# Patient Record
Sex: FEMALE | Race: WHITE | NOT HISPANIC OR LATINO | Employment: OTHER | URBAN - METROPOLITAN AREA
[De-identification: names, ages, dates, MRNs, and addresses within clinical notes are randomized per-mention and may not be internally consistent; named-entity substitution may affect disease eponyms.]

---

## 2017-09-16 ENCOUNTER — HOSPITAL ENCOUNTER (EMERGENCY)
Facility: MEDICAL CENTER | Age: 32
End: 2017-09-16
Attending: EMERGENCY MEDICINE
Payer: COMMERCIAL

## 2017-09-16 ENCOUNTER — HOSPITAL ENCOUNTER (OUTPATIENT)
Dept: RADIOLOGY | Facility: MEDICAL CENTER | Age: 32
End: 2017-09-16

## 2017-09-16 ENCOUNTER — APPOINTMENT (OUTPATIENT)
Dept: RADIOLOGY | Facility: MEDICAL CENTER | Age: 32
End: 2017-09-16
Attending: EMERGENCY MEDICINE
Payer: COMMERCIAL

## 2017-09-16 VITALS
TEMPERATURE: 98.6 F | HEIGHT: 69 IN | SYSTOLIC BLOOD PRESSURE: 143 MMHG | HEART RATE: 69 BPM | WEIGHT: 251.32 LBS | DIASTOLIC BLOOD PRESSURE: 74 MMHG | BODY MASS INDEX: 37.22 KG/M2 | RESPIRATION RATE: 16 BRPM | OXYGEN SATURATION: 97 %

## 2017-09-16 DIAGNOSIS — O20.0 ABORTION, THREATENED: ICD-10-CM

## 2017-09-16 PROCEDURE — 700102 HCHG RX REV CODE 250 W/ 637 OVERRIDE(OP): Performed by: EMERGENCY MEDICINE

## 2017-09-16 PROCEDURE — A9270 NON-COVERED ITEM OR SERVICE: HCPCS | Performed by: EMERGENCY MEDICINE

## 2017-09-16 PROCEDURE — 76817 TRANSVAGINAL US OBSTETRIC: CPT

## 2017-09-16 PROCEDURE — 99284 EMERGENCY DEPT VISIT MOD MDM: CPT

## 2017-09-16 RX ORDER — ACETAMINOPHEN 325 MG/1
650 TABLET ORAL ONCE
Status: COMPLETED | OUTPATIENT
Start: 2017-09-16 | End: 2017-09-16

## 2017-09-16 RX ORDER — FOLIC ACID 1 MG/1
1 TABLET ORAL DAILY
COMMUNITY

## 2017-09-16 RX ADMIN — ACETAMINOPHEN 650 MG: 325 TABLET, FILM COATED ORAL at 21:21

## 2017-09-17 NOTE — ED PROVIDER NOTES
ED Provider Note    Scribed for Paulie Akins M.D. by Kristopher Palomo. 2017  6:29 PM    Means of arrival: Walk-in  History obtained from: Patient  History limited by: None    CHIEF COMPLAINT  Chief Complaint   Patient presents with   • Vaginal Bleeding     approx 8 weeks pregnant   • Sent by MD     rule out ectopic pregnancy       HPI  Hillary Márquez is a 31 y.o. female who presents complaining of vaginal bleeding onset 8:00 PM yesterday evening. Her bleeding onset as minimal brown spotting then increased in severity bright red clots in the middle of the night. She has only used four pads since onset of her vaginal bleeding. Patient has not yet entirely soaked a pad with blood. The patient reports associated pelvic cramping located in the primarily on the lower back, loss of appetite, mild abdominal pain, headache, nausea, and urinary frequency. She had three episodes of diarrhea yesterday after eating lunch, but no diarrhea since. Patient only at toast at breakfast today and has had no fluids. She denies any dysuria, vomiting, or fever. Patient received an abdominal and transvaginal ultrasound as well as blood work at [facility] prompting the physician there to refer here for ectopic pregnancy rule out. Her blood work indicated that her CMP was normal, her potassium was low, and her CBC was normal. Her HCG quantitative was 2200 on  and 6300 today. Her blood typing taken at [facility] is Rh positive. She has no history of diabetes mellitus. She has had two shoulder surgeries, one foot surgery, and cholecystectomy in the past. She denies a past apppendectomy. Six years ago the patient did not have a period for several months secondary to a gallbladder condiiton and rapid weight loss. Patient is status  at approximately eight weeks and her last menstrual period was on .      REVIEW OF SYSTEMS  Pertinent positives include: vaginal bleeding, pelvic cramping located in the primarily on the lower  "back, diarrhea, loss of appetite, mild abdominal pain, headache, nausea, and urinary frequency.  Pertinent negatives include: dysuria, vomiting, or fever.  10+ systems reviewed and negative.    C    PAST MEDICAL HISTORY      SOCIAL HISTORY  Social History   Substance Use Topics   • Smoking status: Never Smoker   • Smokeless tobacco: Never Used   • Alcohol use No     History   Drug Use No       SURGICAL HISTORY  Past Surgical History:   Procedure Laterality Date   • CHOLECYSTECTOMY     • DENTAL EXTRACTION(S)     • FOOT ORIF Right    • SHOULDER ARTHROSCOPY      x2       CURRENT MEDICATIONS  No current facility-administered medications for this encounter.      Current Outpatient Prescriptions   Medication Sig Dispense Refill   • Prenatal Multivit-Min-Fe-FA (PRE- PO) Take  by mouth.     • folic acid (FOLVITE) 1 MG Tab Take 1 mg by mouth every day.         ALLERGIES  Allergies   Allergen Reactions   • Pcn [Penicillins]        PHYSICAL EXAM  VITAL SIGNS: /74   Pulse 89   Temp 36.9 °C (98.5 °F) (Temporal)   Resp 16   Ht 1.753 m (5' 9\")   Wt 114 kg (251 lb 5.2 oz)   LMP 2017   SpO2 98%   BMI 37.11 kg/m²   Reviewed andHypertensive.  Constitutional: Well developed, Well nourished.  HENT: Normocephalic, atraumatic, bilateral external ears normal, oropharynx moist, No exudates or erythema.   Eyes: PERRLA, conjunctiva pink, no scleral icterus.   Cardiovascular: Regular rate and rhythm. No murmurs, rubs or gallops.   Respiratory: Lungs clear to auscultation bilaterally. No wheezes, rales, or rhonchi.   Gastrointestinal:  Abdomen soft, non-tender, non distended.   Skin: No erythema, no rash.   Genitourinary: No costovertebral angle tenderness.   Neurologic: Alert & oriented x 3, cranial nerves 2-12 intact by passive exam.  No focal deficit noted.  Psychiatric: Affect normal, Judgment normal, Mood normal.     DIFFERENTIAL DIAGNOSIS:  spontaneous  vs ectopic vs viable " pregnancy.    RADIOLOGY/PROCEDURES  US-OB PELVIS TRANSVAGINAL   Final Result      1.  No intrauterine pregnancy demonstrated.   2.  Heterogeneous thickening of the endometrium with complex fluid in the cervical canal, suggesting clot or possibly retained products of conception.   3.  Ectopic pregnancy is not excluded. Recommend followup ultrasound and serial beta-hCG levels.   4.  Complex fluid collection or cystic structure in the cul-de-sac measuring 2.5 cm, possibly hemorrhagic.   5.  Probable LEFT ovary corpus luteum and adjacent follicle.   6.  No evidence for ovarian torsion.      US-FOREIGN FILM ULTRASOUND   Final Result        INTERVENTIONS:  Medications   acetaminophen (TYLENOL) tablet 650 mg (650 mg Oral Given 9/16/17 2121)       COURSE & MEDICAL DECISION MAKING    6:29 PM - Patient seen and examined at bedside. She will received PO fluids to treat for dehydration.    6:42 PM I requested the patient's imaging from [facility].    6:45 PM Review of imaging results from [facility] reveal no adnexal masses or fluid inclusions.    6:50 PM I discussed the patient's case and the above findings with Dr. Clement (Gynecology) who agrees to evaluate the patient.    6:55 PM Recheck: Patient re-evaluated at beside. Patient reports feeling the same. Discussed patient's condition and treatment plan. I informed her that she would be seen by Dr. Clement, Gynecology. The patient understood and is in agreement.    8:55 PM Recheck: Patient re-evaluated at beside. Patient reports a headache, but tolerated her PO fluids. I will order Tylenol tablet 650 mg to treat. Discussed patient's condition and treatment plan. The patient understood and is in agreement.    9:49 PM Paged Gynecology.    9:50 PM Thickening of the lining of the uterus is present, but no uterine pregnancy is present. Cannot rule out ectopic pregnancy. Radiology recommends follow up ultrasound.    9:58 PM I discussed the patient's case and the above  "findings with Dr. Clement (Gynecology) and updated her colleague on her ED test results.    10:02 PM Recheck: Patient re-evaluated at beside. Discussed patient's condition and treatment plan including waiting for a D&E as an outpatient. Patient's radiology results discussed. The patient is resting in bed comfortably. I discussed plans for discharge with a referral to Dr. Clement, GYN, and instructed to return to the ED if her symptoms worsen. Patient understands and agrees. her vitals prior to discharge are:  /74   Pulse 69   Temp 36.9 °C (98.5 °F) (Temporal)   Resp 16   Ht 1.753 m (5' 9\")   Wt 114 kg (251 lb 5.2 oz)   LMP 2017   SpO2 97%   BMI 37.11 kg/m²     Case discussed again with Dr. Clement at discharge. The patient declined dilation and evacuation.    This patient presents with vaginal bleeding and abdominal pain 8 weeks into her 1st pregnancy and likely has a spontaneous . Ectopic pregnancy is still possible. There is no evidence of viable pregnancy. There is no significant anemia..    PLAN:  Repeat hCG 2 days  Follow-up with primary physician in the [place] and with Dr. Posey  Repeat ultrasound  Return to ER for heavy bleeding, severe pain, dizziness, pallor, ill appearance  Threatened  handout given    Camila Posey M.D.  [address] [facility] [place] [address]  [phone]    Schedule an appointment as soon as possible for a visit in 3 days  As needed      CONDITION: Patient will be discharged home in stable condition.    FINAL IMPRESSION  1. , threatened          Electronically signed by: Kristopher Palomo, 2017 6:29 PM    The note accurately reflects work and decisions made by me.  Paulie Akins  2017  10:44 PM      "

## 2017-09-17 NOTE — ED NOTES
Pt ambulates to triage with .  Pt POV transfer from [facility] for possible ectopic pregnancy.  Pt approx 8 weeks pregnant.  Reports vaginal bleeding that started last night.  A&ox4.  Tearful.  Pt to lobby & advised to inform RN of any changes. Charge Rn aware of pt.

## 2017-09-17 NOTE — ED NOTES
DC instructions discussed with patient and family for miscarrage. She will return to ED with heavy bleeding, severe pain, fever chills fainting or other concerning symptoms.  Pt and family verbalized understanding of all DC instructions, follow up and prescriptions discussed.   Pt ambulated to lobby with upright and steady gait. All DC paperwork with patient at time of DC.

## 2017-09-17 NOTE — DISCHARGE INSTRUCTIONS
Threatened Miscarriage    Rest (no vigorous exertion or intercourse) until pain and/or bleeding stop.  Drink plenty of fluids.  Followup with your doctor and Dr. Posey as needed. Obtain repeat blood test in 2 days.  Have your doctor order a followup ultrasound when bleeding resolves or if your conditions worsens Take tylenol for pain.  Return to [facility] for dizziness, fainting, pallor, worsening pain, fever or heavy vaginal bleeding (soaking 4 pads in 4 hours).    Bleeding in pregnancy is common. This is sometimes called a threatened miscarriage. You have been diagnosed as having a threatened miscarriage. This is a pregnancy that is threatening to end before the twentieth week of pregnancy. Often this bleeding stops with bed rest or decreased activities as suggested by your caregiver. You may be asked to not have sexual intercourse until further notice. Sometimes this can progress to complete or incomplete miscarriage. This may or may not require further treatment.    When a miscarriage becomes complete and all products of conception have been passed, often no treatment is needed. If the miscarriage is incomplete (parts of the fetus or placenta remain behind), further treatment may be needed. The most common reason for further treatment is continued bleeding (hemorrhage). This often occurs if a miscarriage is incomplete. Tissue left behind may also become infected. Treatment usually is curettage. This is when the remaining products of pregnancy are removed. This can be done by a simple sucking procedure (suction curettage) or a simple scraping of the inside of the uterus. This may be done in the hospital or in the caregiver's office. This is only done when your caregiver knows that there is no chance for the pregnancy to proceed to term. This is determined by physical examination, negative pregnancy test, and an ultrasound revealing a dead fetus.    Miscarriages are often a very emotional time for  prospective mothers. This is not you or your partners fault. It did not occur because of an inadequacy in you or your partner. Nearly all miscarriages occur because the pregnancy has started off wrongly. At least half of these pregnancies have a chromosomal abnormality. It is almost always not inherited. Others may have developmental problems with the fetus or placenta. This does not always show up even when the products miscarried are studied under the microscope. The miscarriage is nearly always not your fault. You can begin trying for another pregnancy as soon as your caregiver says it is OK.    HOME CARE INSTRUCTIONS  Ø Your caregiver may order bed rest. You may only get up to the bathroom. You may be allowed to to continue light activity. You may need to make arrangements for the care of children and for any other responsibilities.  Ø Keep track of the number of pads you use each day and how saturated (soaked) they are. Record this information.  Ø DO NOT USE TAMPONS. Do not douche or have sexual intercourse until approved by your caregiver.    seek immediate medical attention if:  Ø You have severe cramps in your stomach, back, or abdomen.  Ø You run an unexplained temperature.  Ø You pass large clots or tissue. Save any tissue for your caregiver to inspect.  Ø Your bleeding increases or you become light-headed, weak, or have fainting episodes.    [employer]® Patient Information ©2007 [employer].

## 2019-08-08 ENCOUNTER — HOSPITAL ENCOUNTER (INPATIENT)
Dept: HOSPITAL 8 - LDIP | Age: 34
LOS: 3 days | Discharge: HOME | End: 2019-08-11
Attending: OBSTETRICS & GYNECOLOGY | Admitting: OBSTETRICS & GYNECOLOGY
Payer: COMMERCIAL

## 2019-08-08 VITALS — HEIGHT: 69 IN | WEIGHT: 293 LBS | BODY MASS INDEX: 43.4 KG/M2

## 2019-08-08 DIAGNOSIS — Z83.3: ICD-10-CM

## 2019-08-08 DIAGNOSIS — Z90.49: ICD-10-CM

## 2019-08-08 DIAGNOSIS — Z82.49: ICD-10-CM

## 2019-08-08 DIAGNOSIS — Z3A.40: ICD-10-CM

## 2019-08-08 PROCEDURE — 36415 COLL VENOUS BLD VENIPUNCTURE: CPT

## 2019-08-08 PROCEDURE — 86900 BLOOD TYPING SEROLOGIC ABO: CPT

## 2019-08-08 PROCEDURE — 85025 COMPLETE CBC W/AUTO DIFF WBC: CPT

## 2019-08-08 PROCEDURE — 86850 RBC ANTIBODY SCREEN: CPT

## 2019-08-09 PROCEDURE — 10907ZC DRAINAGE OF AMNIOTIC FLUID, THERAPEUTIC FROM PRODUCTS OF CONCEPTION, VIA NATURAL OR ARTIFICIAL OPENING: ICD-10-PCS | Performed by: OBSTETRICS & GYNECOLOGY

## 2019-08-09 PROCEDURE — 00HU33Z INSERTION OF INFUSION DEVICE INTO SPINAL CANAL, PERCUTANEOUS APPROACH: ICD-10-PCS | Performed by: OBSTETRICS & GYNECOLOGY

## 2019-08-09 PROCEDURE — 3E0R3BZ INTRODUCTION OF ANESTHETIC AGENT INTO SPINAL CANAL, PERCUTANEOUS APPROACH: ICD-10-PCS | Performed by: OBSTETRICS & GYNECOLOGY

## 2019-08-09 PROCEDURE — 3E033VJ INTRODUCTION OF OTHER HORMONE INTO PERIPHERAL VEIN, PERCUTANEOUS APPROACH: ICD-10-PCS | Performed by: OBSTETRICS & GYNECOLOGY

## 2019-08-11 VITALS — DIASTOLIC BLOOD PRESSURE: 79 MMHG | SYSTOLIC BLOOD PRESSURE: 129 MMHG

## 2019-10-10 ENCOUNTER — APPOINTMENT (RX ONLY)
Dept: URBAN - METROPOLITAN AREA CLINIC 4 | Facility: CLINIC | Age: 34
Setting detail: DERMATOLOGY
End: 2019-10-10

## 2019-10-10 DIAGNOSIS — Q826 OTHER SPECIFIED ANOMALIES OF SKIN: ICD-10-CM

## 2019-10-10 DIAGNOSIS — Q819 OTHER SPECIFIED ANOMALIES OF SKIN: ICD-10-CM

## 2019-10-10 DIAGNOSIS — Z71.89 OTHER SPECIFIED COUNSELING: ICD-10-CM

## 2019-10-10 DIAGNOSIS — D22 MELANOCYTIC NEVI: ICD-10-CM

## 2019-10-10 DIAGNOSIS — Q828 OTHER SPECIFIED ANOMALIES OF SKIN: ICD-10-CM

## 2019-10-10 DIAGNOSIS — L82.1 OTHER SEBORRHEIC KERATOSIS: ICD-10-CM

## 2019-10-10 DIAGNOSIS — B07.8 OTHER VIRAL WARTS: ICD-10-CM

## 2019-10-10 DIAGNOSIS — D18.0 HEMANGIOMA: ICD-10-CM

## 2019-10-10 DIAGNOSIS — L81.4 OTHER MELANIN HYPERPIGMENTATION: ICD-10-CM

## 2019-10-10 PROBLEM — D18.01 HEMANGIOMA OF SKIN AND SUBCUTANEOUS TISSUE: Status: ACTIVE | Noted: 2019-10-10

## 2019-10-10 PROBLEM — D22.71 MELANOCYTIC NEVI OF RIGHT LOWER LIMB, INCLUDING HIP: Status: ACTIVE | Noted: 2019-10-10

## 2019-10-10 PROBLEM — D22.5 MELANOCYTIC NEVI OF TRUNK: Status: ACTIVE | Noted: 2019-10-10

## 2019-10-10 PROBLEM — D22.62 MELANOCYTIC NEVI OF LEFT UPPER LIMB, INCLUDING SHOULDER: Status: ACTIVE | Noted: 2019-10-10

## 2019-10-10 PROBLEM — D48.5 NEOPLASM OF UNCERTAIN BEHAVIOR OF SKIN: Status: ACTIVE | Noted: 2019-10-10

## 2019-10-10 PROBLEM — D22.61 MELANOCYTIC NEVI OF RIGHT UPPER LIMB, INCLUDING SHOULDER: Status: ACTIVE | Noted: 2019-10-10

## 2019-10-10 PROBLEM — Q82.8 OTHER SPECIFIED CONGENITAL MALFORMATIONS OF SKIN: Status: ACTIVE | Noted: 2019-10-10

## 2019-10-10 PROBLEM — D22.72 MELANOCYTIC NEVI OF LEFT LOWER LIMB, INCLUDING HIP: Status: ACTIVE | Noted: 2019-10-10

## 2019-10-10 PROCEDURE — ? ADDITIONAL NOTES

## 2019-10-10 PROCEDURE — 17110 DESTRUCTION B9 LES UP TO 14: CPT

## 2019-10-10 PROCEDURE — ? BIOPSY BY SHAVE METHOD

## 2019-10-10 PROCEDURE — 11102 TANGNTL BX SKIN SINGLE LES: CPT | Mod: 59

## 2019-10-10 PROCEDURE — ? LIQUID NITROGEN

## 2019-10-10 PROCEDURE — 99203 OFFICE O/P NEW LOW 30 MIN: CPT | Mod: 25

## 2019-10-10 PROCEDURE — ? COUNSELING

## 2019-10-10 ASSESSMENT — LOCATION DETAILED DESCRIPTION DERM
LOCATION DETAILED: RIGHT PROXIMAL LATERAL POSTERIOR UPPER ARM
LOCATION DETAILED: LEFT POSTERIOR SHOULDER
LOCATION DETAILED: PERIUMBILICAL SKIN
LOCATION DETAILED: RIGHT KNEE
LOCATION DETAILED: LEFT ANTERIOR DISTAL UPPER ARM
LOCATION DETAILED: RIGHT PROXIMAL PRETIBIAL REGION
LOCATION DETAILED: LOWER STERNUM
LOCATION DETAILED: EPIGASTRIC SKIN
LOCATION DETAILED: RIGHT ANTERIOR DISTAL THIGH
LOCATION DETAILED: RIGHT MID RADIAL DORSAL INDEX FINGER
LOCATION DETAILED: LEFT PROXIMAL PRETIBIAL REGION
LOCATION DETAILED: RIGHT VENTRAL PROXIMAL FOREARM
LOCATION DETAILED: LEFT VENTRAL PROXIMAL FOREARM
LOCATION DETAILED: RIGHT ANTECUBITAL SKIN
LOCATION DETAILED: LEFT KNEE
LOCATION DETAILED: LEFT ANTERIOR DISTAL THIGH
LOCATION DETAILED: LEFT ANTERIOR PROXIMAL UPPER ARM
LOCATION DETAILED: RIGHT ANTERIOR DISTAL UPPER ARM
LOCATION DETAILED: RIGHT ANTERIOR PROXIMAL UPPER ARM

## 2019-10-10 ASSESSMENT — LOCATION ZONE DERM
LOCATION ZONE: TRUNK
LOCATION ZONE: ARM
LOCATION ZONE: FINGER
LOCATION ZONE: LEG

## 2019-10-10 ASSESSMENT — LOCATION SIMPLE DESCRIPTION DERM
LOCATION SIMPLE: RIGHT FOREARM
LOCATION SIMPLE: RIGHT PRETIBIAL REGION
LOCATION SIMPLE: LEFT KNEE
LOCATION SIMPLE: LEFT FOREARM
LOCATION SIMPLE: LEFT UPPER ARM
LOCATION SIMPLE: ABDOMEN
LOCATION SIMPLE: RIGHT INDEX FINGER
LOCATION SIMPLE: LEFT SHOULDER
LOCATION SIMPLE: RIGHT KNEE
LOCATION SIMPLE: LEFT THIGH
LOCATION SIMPLE: CHEST
LOCATION SIMPLE: RIGHT UPPER ARM
LOCATION SIMPLE: RIGHT POSTERIOR UPPER ARM
LOCATION SIMPLE: LEFT PRETIBIAL REGION
LOCATION SIMPLE: RIGHT THIGH

## 2019-10-10 NOTE — PROCEDURE: BIOPSY BY SHAVE METHOD
Lab Facility: 
Cryotherapy Text: The wound bed was treated with cryotherapy after the biopsy was performed.
Post-Care Instructions: I reviewed with the patient in detail post-care instructions. Patient is to keep the biopsy site dry overnight, and then apply bacitracin twice daily until healed. Patient may apply hydrogen peroxide soaks to remove any crusting.
Additional Anesthesia Volume In Cc (Will Not Render If 0): 0
Size Of Lesion In Cm: 1.2
Destruction After The Procedure: No
Anesthesia Type: 1% lidocaine with epinephrine
X Size Of Lesion In Cm: 1
Biopsy Type: H and E
Wound Care: Petrolatum
Depth Of Biopsy: dermis
Notification Instructions: Patient will be notified of biopsy results. However, patient instructed to call the office if not contacted within 2 weeks.
Electrodesiccation Text: The wound bed was treated with electrodesiccation after the biopsy was performed.
Dressing: bandage
Was A Bandage Applied: Yes
Electrodesiccation And Curettage Text: The wound bed was treated with electrodesiccation and curettage after the biopsy was performed.
Anesthesia Volume In Cc: 0.5
Consent: Written consent was obtained and risks were reviewed including but not limited to scarring, infection, bleeding, scabbing, incomplete removal, nerve damage and allergy to anesthesia.
Type Of Destruction Used: Curettage
Curettage Text: The wound bed was treated with curettage after the biopsy was performed.
Lab: [id number]
Billing Type: Third-Party Bill
Silver Nitrate Text: The wound bed was treated with silver nitrate after the biopsy was performed.
Biopsy Method: Dermablade
Detail Level: Detailed
Hemostasis: Drysol

## 2019-10-10 NOTE — PROCEDURE: COUNSELING
Call out to pt  States she spoke to Yanique earlier regarding her CBC  Informed the other results are not completed yet  Will call her when obtain results and direction form provider    Azul James RN, BS  Clinical Nurse Triage.    
Detail Level: Zone
Ferritin and iron results not in yet  Jameson Price RN    
Ph. [phone] able to leave message    Patient calling for lab results.  Patient states was seen in clinic and was told she would hear from PCP this afternoon on results    Milagros Wilson   
Detail Level: Detailed

## 2019-10-10 NOTE — PROCEDURE: ADDITIONAL NOTES
Additional Notes: Recommend patient stick with moisturizer and exfolliants given she is breast feeding
Detail Level: Simple
Additional Notes: Monitor lesion in gluteal cleft Size: 1.1 x .6 cm - gluteal cleft.

## 2021-04-23 LAB
ABO GROUP BLD: NORMAL
BLD GP AB SCN SERPL QL: NEGATIVE
HBV SURFACE AG SERPL QL IA: NEGATIVE
HIV 1+2 AB+HIV1 P24 AG SERPL QL IA: NORMAL
RH BLD: POSITIVE
RUBV IGG SERPL IA-ACNC: NORMAL
TREPONEMA PALLIDUM IGG+IGM AB [PRESENCE] IN SERUM OR PLASMA BY IMMUNOASSAY: NORMAL

## 2021-07-31 LAB — GLUCOSE 1H P 50 G GLC PO SERPL-MCNC: 122 MG/DL

## 2021-11-10 LAB — GP B STREP DNA SPEC QL NAA+PROBE: POSITIVE

## 2021-11-15 ENCOUNTER — HOSPITAL ENCOUNTER (INPATIENT)
Facility: MEDICAL CENTER | Age: 36
LOS: 2 days | End: 2021-11-18
Attending: OBSTETRICS & GYNECOLOGY | Admitting: OBSTETRICS & GYNECOLOGY
Payer: COMMERCIAL

## 2021-11-15 DIAGNOSIS — G89.18 PAIN FOLLOWING SURGERY OR PROCEDURE: ICD-10-CM

## 2021-11-15 LAB
ALBUMIN SERPL BCP-MCNC: 3.6 G/DL (ref 3.2–4.9)
ALBUMIN/GLOB SERPL: 1.1 G/DL
ALP SERPL-CCNC: 117 U/L (ref 30–99)
ALT SERPL-CCNC: 9 U/L (ref 2–50)
ANION GAP SERPL CALC-SCNC: 15 MMOL/L (ref 7–16)
AST SERPL-CCNC: 16 U/L (ref 12–45)
BASOPHILS # BLD AUTO: 0.2 % (ref 0–1.8)
BASOPHILS # BLD: 0.03 K/UL (ref 0–0.12)
BILIRUB SERPL-MCNC: <0.2 MG/DL (ref 0.1–1.5)
BUN SERPL-MCNC: 19 MG/DL (ref 8–22)
CALCIUM SERPL-MCNC: 9 MG/DL (ref 8.5–10.5)
CHLORIDE SERPL-SCNC: 102 MMOL/L (ref 96–112)
CO2 SERPL-SCNC: 20 MMOL/L (ref 20–33)
CREAT SERPL-MCNC: 0.67 MG/DL (ref 0.5–1.4)
CREAT UR-MCNC: 199.68 MG/DL
EOSINOPHIL # BLD AUTO: 0.04 K/UL (ref 0–0.51)
EOSINOPHIL NFR BLD: 0.3 % (ref 0–6.9)
ERYTHROCYTE [DISTWIDTH] IN BLOOD BY AUTOMATED COUNT: 43.5 FL (ref 35.9–50)
GLOBULIN SER CALC-MCNC: 3.3 G/DL (ref 1.9–3.5)
GLUCOSE SERPL-MCNC: 74 MG/DL (ref 65–99)
HCT VFR BLD AUTO: 39.4 % (ref 37–47)
HGB BLD-MCNC: 13.1 G/DL (ref 12–16)
HOLDING TUBE BB 8507: NORMAL
IMM GRANULOCYTES # BLD AUTO: 0.08 K/UL (ref 0–0.11)
IMM GRANULOCYTES NFR BLD AUTO: 0.6 % (ref 0–0.9)
LYMPHOCYTES # BLD AUTO: 2.21 K/UL (ref 1–4.8)
LYMPHOCYTES NFR BLD: 16.8 % (ref 22–41)
MCH RBC QN AUTO: 30 PG (ref 27–33)
MCHC RBC AUTO-ENTMCNC: 33.2 G/DL (ref 33.6–35)
MCV RBC AUTO: 90.4 FL (ref 81.4–97.8)
MONOCYTES # BLD AUTO: 0.9 K/UL (ref 0–0.85)
MONOCYTES NFR BLD AUTO: 6.8 % (ref 0–13.4)
NEUTROPHILS # BLD AUTO: 9.93 K/UL (ref 2–7.15)
NEUTROPHILS NFR BLD: 75.3 % (ref 44–72)
NRBC # BLD AUTO: 0 K/UL
NRBC BLD-RTO: 0 /100 WBC
PLATELET # BLD AUTO: 278 K/UL (ref 164–446)
PMV BLD AUTO: 10.1 FL (ref 9–12.9)
POTASSIUM SERPL-SCNC: 4.2 MMOL/L (ref 3.6–5.5)
PROT SERPL-MCNC: 6.9 G/DL (ref 6–8.2)
PROT UR-MCNC: 15 MG/DL (ref 0–15)
PROT/CREAT UR: 75 MG/G (ref 10–107)
RBC # BLD AUTO: 4.36 M/UL (ref 4.2–5.4)
SODIUM SERPL-SCNC: 137 MMOL/L (ref 135–145)
WBC # BLD AUTO: 13.2 K/UL (ref 4.8–10.8)

## 2021-11-15 PROCEDURE — 82570 ASSAY OF URINE CREATININE: CPT

## 2021-11-15 PROCEDURE — 80053 COMPREHEN METABOLIC PANEL: CPT

## 2021-11-15 PROCEDURE — A9270 NON-COVERED ITEM OR SERVICE: HCPCS | Performed by: OBSTETRICS & GYNECOLOGY

## 2021-11-15 PROCEDURE — 85025 COMPLETE CBC W/AUTO DIFF WBC: CPT

## 2021-11-15 PROCEDURE — 87426 SARSCOV CORONAVIRUS AG IA: CPT

## 2021-11-15 PROCEDURE — 700102 HCHG RX REV CODE 250 W/ 637 OVERRIDE(OP): Performed by: OBSTETRICS & GYNECOLOGY

## 2021-11-15 PROCEDURE — 84156 ASSAY OF PROTEIN URINE: CPT

## 2021-11-15 PROCEDURE — 700111 HCHG RX REV CODE 636 W/ 250 OVERRIDE (IP): Performed by: OBSTETRICS & GYNECOLOGY

## 2021-11-15 RX ORDER — CEFAZOLIN SODIUM 2 G/100ML
2 INJECTION, SOLUTION INTRAVENOUS ONCE
Status: COMPLETED | OUTPATIENT
Start: 2021-11-15 | End: 2021-11-16

## 2021-11-15 RX ORDER — DEXTROSE, SODIUM CHLORIDE, SODIUM LACTATE, POTASSIUM CHLORIDE, AND CALCIUM CHLORIDE 5; .6; .31; .03; .02 G/100ML; G/100ML; G/100ML; G/100ML; G/100ML
INJECTION, SOLUTION INTRAVENOUS CONTINUOUS
Status: DISCONTINUED | OUTPATIENT
Start: 2021-11-16 | End: 2021-11-16 | Stop reason: HOSPADM

## 2021-11-15 RX ORDER — ASPIRIN 81 MG/1
81 TABLET, CHEWABLE ORAL DAILY
Status: ON HOLD | COMMUNITY
End: 2021-11-17

## 2021-11-15 RX ORDER — CARBOPROST TROMETHAMINE 250 UG/ML
250 INJECTION, SOLUTION INTRAMUSCULAR
Status: DISCONTINUED | OUTPATIENT
Start: 2021-11-15 | End: 2021-11-16 | Stop reason: HOSPADM

## 2021-11-15 RX ORDER — ACETAMINOPHEN 325 MG/1
650 TABLET ORAL EVERY 4 HOURS PRN
Status: DISCONTINUED | OUTPATIENT
Start: 2021-11-15 | End: 2021-11-18 | Stop reason: HOSPADM

## 2021-11-15 RX ORDER — SODIUM CHLORIDE, SODIUM LACTATE, POTASSIUM CHLORIDE, CALCIUM CHLORIDE 600; 310; 30; 20 MG/100ML; MG/100ML; MG/100ML; MG/100ML
INJECTION, SOLUTION INTRAVENOUS CONTINUOUS
Status: ACTIVE | OUTPATIENT
Start: 2021-11-15 | End: 2021-11-16

## 2021-11-15 RX ORDER — CEFAZOLIN SODIUM 1 G/50ML
1 INJECTION, SOLUTION INTRAVENOUS EVERY 8 HOURS
Status: DISCONTINUED | OUTPATIENT
Start: 2021-11-16 | End: 2021-11-16 | Stop reason: HOSPADM

## 2021-11-15 RX ORDER — MISOPROSTOL 200 UG/1
800 TABLET ORAL
Status: DISCONTINUED | OUTPATIENT
Start: 2021-11-15 | End: 2021-11-16 | Stop reason: HOSPADM

## 2021-11-15 RX ADMIN — ACETAMINOPHEN 650 MG: 325 TABLET, FILM COATED ORAL at 21:36

## 2021-11-15 RX ADMIN — MISOPROSTOL 25 MCG: 100 TABLET ORAL at 20:48

## 2021-11-15 RX ADMIN — CEFAZOLIN SODIUM 2 G: 2 INJECTION, SOLUTION INTRAVENOUS at 21:21

## 2021-11-15 ASSESSMENT — PATIENT HEALTH QUESTIONNAIRE - PHQ9
1. LITTLE INTEREST OR PLEASURE IN DOING THINGS: NOT AT ALL
2. FEELING DOWN, DEPRESSED, IRRITABLE, OR HOPELESS: NOT AT ALL
SUM OF ALL RESPONSES TO PHQ9 QUESTIONS 1 AND 2: 0

## 2021-11-15 ASSESSMENT — LIFESTYLE VARIABLES: EVER_SMOKED: NEVER

## 2021-11-15 NOTE — H&P
"DATE OF ADMISSION:  11/15/2021     IDENTIFICATION:  This is a 36-year-old  4, para 1-0-2-1, with an EDC of   2021 and EGA of 39 and 1/7 weeks who presents with a chief complaint of   \"my blood pressure is elevated.\"     HISTORY OF PRESENT ILLNESS:  This is a patient of mine who has gotten good   prenatal care.  Of note, she is of advanced maternal age.  She had normal   first trimester screen with perinatology.  She declined other genetics.  She   has been vaccinated against COVID, but had a breakthrough COVID case   approximately 37 weeks.  She has been on baby aspirin since that time, but her   blood pressures have also been elevated after COVID diagnosis.  [facility] labs were   normal on  with a protein-creatinine ratio of 174, but today she   presents to the office stating that her blood pressures at home have been in   the 148-154/ range.  She does have 1+ proteinuria, trace edema.  She   denies headaches, visual changes, epigastric pain, nondependent edema.  She   admits good fetal movement.  NST is reactive, category 1.  She is known beta   strep positive and after careful counseling I made the recommendation of   induction of labor for gestational hypertension.     OBSTETRICAL HISTORY:  Significant for 2 spontaneous abortions and a term   vaginal delivery.     GYNECOLOGIC HISTORY:  No STDs or abnormal Paps.     PAST MEDICAL HISTORY:  ALLERGIES:  LATEX AND PENICILLIN.     MEDICAL PROBLEMS:  None.     PAST SURGICAL HISTORY:  Tonsillectomy, wisdom teeth, right shoulder   arthroscopy, foot surgery, laparoscopic cholecystectomy.     SOCIAL HISTORY:  Denies alcohol, tobacco or drug abuse.     FAMILY HISTORY:  Noncontributory.     REVIEW OF SYSTEMS:  Times 12 is negative per AMA standards available in chart.     LABORATORY DATA:  She is Rh positive, rubella immune.  She had normal   one-hour.  She is beta strep positive.  She declined genetics.     PHYSICAL EXAMINATION:  VITAL SIGNS:  The patient " is afebrile, current blood pressure 138/86.  Fetal   heart tracings reactive, category 1 by NST in the office today.  GENERAL:  She is awake, alert, in no apparent distress.  NECK:  Supple.  HEART:  Regular.  CHEST:  Clear.  BREASTS:  Symmetrical.  ABDOMEN:  Soft, gravid, size appropriate for dates.  Pannus nontender.  EXTREMITIES:  No cyanosis, clubbing, trace edema, 2+ DTRs.  GYNECOLOGIC:  Cervix is 1, 50, -2.     ASSESSMENT:  At this time:  1.  Pregnancy at 39 and 1/7th weeks.  2.  Gestational hypertension without severe features.  3.  Advanced maternal age, declines genetics.  4.  Beta streptococcus positive with PENICILLIN ALLERGY.  5.  Status post COVID vaccination with subsequent breakthrough COVID case at   37-1/2 weeks.  6.  Fetal status reassuring.     PLAN:  At this time;  1.  Admit.  2.  Fetal heart tone and contraction monitoring.  3.  Pain control.  4.  Probable Cytotec followed by Pitocin.  5.  IV antibiotics.  6.  We will check PIH labs.  7.  We will consent for and anticipate normal spontaneous vaginal delivery.        ______________________________  MD RADHA Farris/JESSI    DD:  11/15/2021 14:18  DT:  11/15/2021 15:14    Job#:  [id number]

## 2021-11-16 ENCOUNTER — ANESTHESIA (OUTPATIENT)
Dept: ANESTHESIOLOGY | Facility: MEDICAL CENTER | Age: 36
End: 2021-11-16
Payer: COMMERCIAL

## 2021-11-16 ENCOUNTER — ANESTHESIA EVENT (OUTPATIENT)
Dept: ANESTHESIOLOGY | Facility: MEDICAL CENTER | Age: 36
End: 2021-11-16
Payer: COMMERCIAL

## 2021-11-16 LAB
SARS-COV+SARS-COV-2 AG RESP QL IA.RAPID: NOTDETECTED
SPECIMEN SOURCE: NORMAL

## 2021-11-16 PROCEDURE — 700105 HCHG RX REV CODE 258: Performed by: OBSTETRICS & GYNECOLOGY

## 2021-11-16 PROCEDURE — 3E033VJ INTRODUCTION OF OTHER HORMONE INTO PERIPHERAL VEIN, PERCUTANEOUS APPROACH: ICD-10-PCS | Performed by: OBSTETRICS & GYNECOLOGY

## 2021-11-16 PROCEDURE — 700102 HCHG RX REV CODE 250 W/ 637 OVERRIDE(OP): Performed by: OBSTETRICS & GYNECOLOGY

## 2021-11-16 PROCEDURE — 10907ZC DRAINAGE OF AMNIOTIC FLUID, THERAPEUTIC FROM PRODUCTS OF CONCEPTION, VIA NATURAL OR ARTIFICIAL OPENING: ICD-10-PCS | Performed by: OBSTETRICS & GYNECOLOGY

## 2021-11-16 PROCEDURE — 0KQM0ZZ REPAIR PERINEUM MUSCLE, OPEN APPROACH: ICD-10-PCS | Performed by: OBSTETRICS & GYNECOLOGY

## 2021-11-16 PROCEDURE — 700111 HCHG RX REV CODE 636 W/ 250 OVERRIDE (IP): Performed by: ANESTHESIOLOGY

## 2021-11-16 PROCEDURE — 303615 HCHG EPIDURAL/SPINAL ANESTHESIA FOR LABOR

## 2021-11-16 PROCEDURE — A9270 NON-COVERED ITEM OR SERVICE: HCPCS | Performed by: OBSTETRICS & GYNECOLOGY

## 2021-11-16 PROCEDURE — 700105 HCHG RX REV CODE 258: Performed by: ANESTHESIOLOGY

## 2021-11-16 PROCEDURE — 10H07YZ INSERTION OF OTHER DEVICE INTO PRODUCTS OF CONCEPTION, VIA NATURAL OR ARTIFICIAL OPENING: ICD-10-PCS | Performed by: OBSTETRICS & GYNECOLOGY

## 2021-11-16 PROCEDURE — 770002 HCHG ROOM/CARE - OB PRIVATE (112)

## 2021-11-16 PROCEDURE — 700111 HCHG RX REV CODE 636 W/ 250 OVERRIDE (IP)

## 2021-11-16 PROCEDURE — 304965 HCHG RECOVERY SERVICES

## 2021-11-16 PROCEDURE — 700111 HCHG RX REV CODE 636 W/ 250 OVERRIDE (IP): Performed by: OBSTETRICS & GYNECOLOGY

## 2021-11-16 PROCEDURE — 59409 OBSTETRICAL CARE: CPT

## 2021-11-16 PROCEDURE — 3E0P7VZ INTRODUCTION OF HORMONE INTO FEMALE REPRODUCTIVE, VIA NATURAL OR ARTIFICIAL OPENING: ICD-10-PCS | Performed by: OBSTETRICS & GYNECOLOGY

## 2021-11-16 RX ORDER — IBUPROFEN 600 MG/1
600 TABLET ORAL EVERY 6 HOURS PRN
Status: DISCONTINUED | OUTPATIENT
Start: 2021-11-16 | End: 2021-11-16 | Stop reason: HOSPADM

## 2021-11-16 RX ORDER — BUPIVACAINE HYDROCHLORIDE 2.5 MG/ML
INJECTION, SOLUTION EPIDURAL; INFILTRATION; INTRACAUDAL PRN
Status: DISCONTINUED | OUTPATIENT
Start: 2021-11-16 | End: 2021-11-16 | Stop reason: SURG

## 2021-11-16 RX ORDER — IBUPROFEN 800 MG/1
800 TABLET ORAL EVERY 8 HOURS PRN
Status: DISCONTINUED | OUTPATIENT
Start: 2021-11-16 | End: 2021-11-18 | Stop reason: HOSPADM

## 2021-11-16 RX ORDER — SODIUM CHLORIDE, SODIUM LACTATE, POTASSIUM CHLORIDE, AND CALCIUM CHLORIDE .6; .31; .03; .02 G/100ML; G/100ML; G/100ML; G/100ML
1000 INJECTION, SOLUTION INTRAVENOUS ONCE
Status: COMPLETED | OUTPATIENT
Start: 2021-11-16 | End: 2021-11-16

## 2021-11-16 RX ORDER — ROPIVACAINE HYDROCHLORIDE 2 MG/ML
INJECTION, SOLUTION EPIDURAL; INFILTRATION; PERINEURAL CONTINUOUS
Status: DISCONTINUED | OUTPATIENT
Start: 2021-11-16 | End: 2021-11-16 | Stop reason: HOSPADM

## 2021-11-16 RX ORDER — SODIUM CHLORIDE, SODIUM LACTATE, POTASSIUM CHLORIDE, CALCIUM CHLORIDE 600; 310; 30; 20 MG/100ML; MG/100ML; MG/100ML; MG/100ML
INJECTION, SOLUTION INTRAVENOUS PRN
Status: DISCONTINUED | OUTPATIENT
Start: 2021-11-16 | End: 2021-11-18 | Stop reason: HOSPADM

## 2021-11-16 RX ORDER — DOCUSATE SODIUM 100 MG/1
100 CAPSULE, LIQUID FILLED ORAL 2 TIMES DAILY PRN
Status: DISCONTINUED | OUTPATIENT
Start: 2021-11-16 | End: 2021-11-18 | Stop reason: HOSPADM

## 2021-11-16 RX ORDER — OXYCODONE HYDROCHLORIDE AND ACETAMINOPHEN 5; 325 MG/1; MG/1
2 TABLET ORAL EVERY 4 HOURS PRN
Status: DISCONTINUED | OUTPATIENT
Start: 2021-11-16 | End: 2021-11-18 | Stop reason: HOSPADM

## 2021-11-16 RX ORDER — SODIUM CHLORIDE, SODIUM LACTATE, POTASSIUM CHLORIDE, AND CALCIUM CHLORIDE .6; .31; .03; .02 G/100ML; G/100ML; G/100ML; G/100ML
250 INJECTION, SOLUTION INTRAVENOUS PRN
Status: DISCONTINUED | OUTPATIENT
Start: 2021-11-16 | End: 2021-11-16 | Stop reason: HOSPADM

## 2021-11-16 RX ORDER — SODIUM CHLORIDE, SODIUM LACTATE, POTASSIUM CHLORIDE, AND CALCIUM CHLORIDE .6; .31; .03; .02 G/100ML; G/100ML; G/100ML; G/100ML
1000 INJECTION, SOLUTION INTRAVENOUS
Status: COMPLETED | OUTPATIENT
Start: 2021-11-16 | End: 2021-11-16

## 2021-11-16 RX ORDER — VITAMIN A ACETATE, BETA CAROTENE, ASCORBIC ACID, CHOLECALCIFEROL, .ALPHA.-TOCOPHEROL ACETATE, DL-, THIAMINE MONONITRATE, RIBOFLAVIN, NIACINAMIDE, PYRIDOXINE HYDROCHLORIDE, FOLIC ACID, CYANOCOBALAMIN, CALCIUM CARBONATE, FERROUS FUMARATE, ZINC OXIDE, CUPRIC OXIDE 3080; 12; 120; 400; 1; 1.84; 3; 20; 22; 920; 25; 200; 27; 10; 2 [IU]/1; UG/1; MG/1; [IU]/1; MG/1; MG/1; MG/1; MG/1; MG/1; [IU]/1; MG/1; MG/1; MG/1; MG/1; MG/1
1 TABLET, FILM COATED ORAL EVERY MORNING
Status: DISCONTINUED | OUTPATIENT
Start: 2021-11-17 | End: 2021-11-18 | Stop reason: HOSPADM

## 2021-11-16 RX ORDER — MISOPROSTOL 200 UG/1
600 TABLET ORAL
Status: DISCONTINUED | OUTPATIENT
Start: 2021-11-16 | End: 2021-11-18 | Stop reason: HOSPADM

## 2021-11-16 RX ORDER — OXYCODONE HYDROCHLORIDE AND ACETAMINOPHEN 5; 325 MG/1; MG/1
1 TABLET ORAL EVERY 4 HOURS PRN
Status: DISCONTINUED | OUTPATIENT
Start: 2021-11-16 | End: 2021-11-18 | Stop reason: HOSPADM

## 2021-11-16 RX ORDER — ROPIVACAINE HYDROCHLORIDE 2 MG/ML
INJECTION, SOLUTION EPIDURAL; INFILTRATION; PERINEURAL
Status: COMPLETED
Start: 2021-11-16 | End: 2021-11-16

## 2021-11-16 RX ADMIN — OXYTOCIN 41.67 MILLI-UNITS/MIN: 10 INJECTION, SOLUTION INTRAMUSCULAR; INTRAVENOUS at 18:47

## 2021-11-16 RX ADMIN — SODIUM CHLORIDE, POTASSIUM CHLORIDE, SODIUM LACTATE AND CALCIUM CHLORIDE 1000 ML: 600; 310; 30; 20 INJECTION, SOLUTION INTRAVENOUS at 13:36

## 2021-11-16 RX ADMIN — CEFAZOLIN SODIUM 1 G: 1 INJECTION, SOLUTION INTRAVENOUS at 15:15

## 2021-11-16 RX ADMIN — ROPIVACAINE HYDROCHLORIDE: 2 INJECTION, SOLUTION EPIDURAL; INFILTRATION at 13:19

## 2021-11-16 RX ADMIN — ACETAMINOPHEN 650 MG: 325 TABLET, FILM COATED ORAL at 15:15

## 2021-11-16 RX ADMIN — BUPIVACAINE HYDROCHLORIDE 5 ML: 2.5 INJECTION, SOLUTION EPIDURAL; INFILTRATION; INTRACAUDAL; PERINEURAL at 13:18

## 2021-11-16 RX ADMIN — ACETAMINOPHEN 650 MG: 325 TABLET, FILM COATED ORAL at 05:01

## 2021-11-16 RX ADMIN — OXYTOCIN 2 MILLI-UNITS/MIN: 10 INJECTION, SOLUTION INTRAMUSCULAR; INTRAVENOUS at 10:56

## 2021-11-16 RX ADMIN — SODIUM CHLORIDE, POTASSIUM CHLORIDE, SODIUM LACTATE AND CALCIUM CHLORIDE 1000 ML: 600; 310; 30; 20 INJECTION, SOLUTION INTRAVENOUS at 11:02

## 2021-11-16 RX ADMIN — CEFAZOLIN SODIUM 1 G: 1 INJECTION, SOLUTION INTRAVENOUS at 05:00

## 2021-11-16 RX ADMIN — MISOPROSTOL 25 MCG: 100 TABLET ORAL at 01:11

## 2021-11-16 RX ADMIN — IBUPROFEN 800 MG: 800 TABLET, FILM COATED ORAL at 23:17

## 2021-11-16 RX ADMIN — MISOPROSTOL 25 MCG: 100 TABLET ORAL at 05:01

## 2021-11-16 RX ADMIN — ROPIVACAINE HYDROCHLORIDE: 2 INJECTION, SOLUTION EPIDURAL; INFILTRATION; PERINEURAL at 13:19

## 2021-11-16 ASSESSMENT — LIFESTYLE VARIABLES
TOTAL SCORE: 0
EVER FELT BAD OR GUILTY ABOUT YOUR DRINKING: NO
TOTAL SCORE: 0
HAVE YOU EVER FELT YOU SHOULD CUT DOWN ON YOUR DRINKING: NO
CONSUMPTION TOTAL: NEGATIVE
EVER HAD A DRINK FIRST THING IN THE MORNING TO STEADY YOUR NERVES TO GET RID OF A HANGOVER: NO
HOW MANY TIMES IN THE PAST YEAR HAVE YOU HAD 5 OR MORE DRINKS IN A DAY: 0
ON A TYPICAL DAY WHEN YOU DRINK ALCOHOL HOW MANY DRINKS DO YOU HAVE: 0
TOTAL SCORE: 0
AVERAGE NUMBER OF DAYS PER WEEK YOU HAVE A DRINK CONTAINING ALCOHOL: 0
DOES PATIENT WANT TO STOP DRINKING: NO
HAVE PEOPLE ANNOYED YOU BY CRITICIZING YOUR DRINKING: NO
ALCOHOL_USE: NO

## 2021-11-16 ASSESSMENT — PAIN SCALES - GENERAL: PAIN_LEVEL: 4

## 2021-11-16 NOTE — ANESTHESIA PREPROCEDURE EVALUATION
Date: 21  Procedure: Labor Epidural        @ 39w1d, IUP, [place]  GHTN    Relevant Problems   No relevant active problems       Physical Exam    Airway   Mallampati: II  TM distance: >3 FB  Neck ROM: full       Cardiovascular - normal exam  Rhythm: regular  Rate: normal  (-) murmur     Dental - normal exam           Pulmonary - normal exam  Breath sounds clear to auscultation     Abdominal   (+) obese     Neurological - normal exam                 Anesthesia Plan    ASA 3       Plan - epidural   Neuraxial block will be labor analgesia                  Pertinent diagnostic labs and testing reviewed    Informed Consent:    Anesthetic plan and risks discussed with patient.

## 2021-11-16 NOTE — ANESTHESIA PROCEDURE NOTES
Epidural Block    Date/Time: 11/16/2021 1:10 PM  Performed by: Bruno Currie D.O.  Authorized by: Bruno Currie D.O.     Patient Location:  OB  Start Time:  11/16/2021 1:10 PM  End Time:  11/16/2021 1:18 PM  Reason for Block: labor analgesia    patient identified, IV checked, site marked, risks and benefits discussed, surgical consent, monitors and equipment checked, pre-op evaluation and timeout performed    Patient Position:  Sitting  Prep: ChloraPrep, patient draped and sterile technique    Monitoring:  Blood pressure, continuous pulse oximetry and heart rate  Approach:  Midline  Location:  L2-L3  Injection Technique:  ERVIN air  Skin infiltration:  Lidocaine  Strength:  1%  Dose:  4ml  Needle Type:  Tuohy  Needle Gauge:  17 G  Needle Length:  3.5 in  Loss of resistance::  7  Catheter Size:  19 G  Catheter at Skin Depth:  11.5  Test Dose Result:  Negative

## 2021-11-16 NOTE — PROGRESS NOTES
1930 Bedside report received from Naila, plan of care reviewed with patient and  at bedside    2000 Dr. Gabriel to bedside, plan of care reviewed, plan to place Cytotec for cervical ripening; dinner to bedside; US shows infant head down    0500 11 contractions in the last hour, FHR moderate with accels and no decels, 3rd dose of Cytotec placed by Brenton PINO

## 2021-11-16 NOTE — PROGRESS NOTES
Comfortable  2x dose abx  Cat one  occ uc  2.5/60/-2 sp cytotec  arom clear  iupc  Dw labor curve and pain control and will start pit

## 2021-11-16 NOTE — PROGRESS NOTES
0700- Report received from Kathy NOEL RN, POC discused, assumed pt care.   Pt in 221 with FOB   36 y.o.  @ 39.2

## 2021-11-16 NOTE — PROGRESS NOTES
0700 Bedside report received from Fouzia Kang RN. Pt awake and oriented, sitting up in bed. Updated on plan of care for the day, educated on pt safety and use of call button. Will continue to monitor.     0845 Provider in room. IUPC and scalp electrode place. AROM, clear fluid, moderate amount. Pitocin started for induction- see MAR. Pt /-2    1025 scalp electrode removed, not functioning.     1500 pt checked /-2    1530 pt checked complete. Notified provider.     1548  of viable infant female. 3 vessel cord, APGARS 8/9. First degree laceration repaired on Mom.     1550 placenta delivered spontaneous and intact    1900 pt handoff performed to Fouzia Kang. Pt A&O and stable. Pt has moderate bleeding with some clots, so oxytocin is running higher at 350 and keeping an eye on fundal rubs and bleeding. Kathy was notified of this and acknowledged understanding. Patient handoff completed.      Hillary Bowman, 35 y/o, , EDC of 2021, 39.3. In for high Bps, allergy to latex and PCN.

## 2021-11-17 ENCOUNTER — PHARMACY VISIT (OUTPATIENT)
Dept: PHARMACY | Facility: MEDICAL CENTER | Age: 36
End: 2021-11-17
Payer: COMMERCIAL

## 2021-11-17 LAB
ERYTHROCYTE [DISTWIDTH] IN BLOOD BY AUTOMATED COUNT: 44.1 FL (ref 35.9–50)
HCT VFR BLD AUTO: 36 % (ref 37–47)
HGB BLD-MCNC: 12.3 G/DL (ref 12–16)
MCH RBC QN AUTO: 31.1 PG (ref 27–33)
MCHC RBC AUTO-ENTMCNC: 34.2 G/DL (ref 33.6–35)
MCV RBC AUTO: 90.9 FL (ref 81.4–97.8)
PLATELET # BLD AUTO: 234 K/UL (ref 164–446)
PMV BLD AUTO: 9.8 FL (ref 9–12.9)
RBC # BLD AUTO: 3.96 M/UL (ref 4.2–5.4)
WBC # BLD AUTO: 15.2 K/UL (ref 4.8–10.8)

## 2021-11-17 PROCEDURE — A9270 NON-COVERED ITEM OR SERVICE: HCPCS | Performed by: OBSTETRICS & GYNECOLOGY

## 2021-11-17 PROCEDURE — RXMED WILLOW AMBULATORY MEDICATION CHARGE: Performed by: OBSTETRICS & GYNECOLOGY

## 2021-11-17 PROCEDURE — 85027 COMPLETE CBC AUTOMATED: CPT

## 2021-11-17 PROCEDURE — 36415 COLL VENOUS BLD VENIPUNCTURE: CPT

## 2021-11-17 PROCEDURE — 700102 HCHG RX REV CODE 250 W/ 637 OVERRIDE(OP): Performed by: OBSTETRICS & GYNECOLOGY

## 2021-11-17 PROCEDURE — 770002 HCHG ROOM/CARE - OB PRIVATE (112)

## 2021-11-17 RX ORDER — IBUPROFEN 800 MG/1
800 TABLET ORAL EVERY 8 HOURS PRN
Qty: 30 TABLET | Refills: 3 | Status: SHIPPED | OUTPATIENT
Start: 2021-11-17

## 2021-11-17 RX ADMIN — ACETAMINOPHEN 650 MG: 325 TABLET, FILM COATED ORAL at 14:15

## 2021-11-17 RX ADMIN — ACETAMINOPHEN 650 MG: 325 TABLET, FILM COATED ORAL at 18:04

## 2021-11-17 RX ADMIN — IBUPROFEN 800 MG: 800 TABLET, FILM COATED ORAL at 17:33

## 2021-11-17 RX ADMIN — ACETAMINOPHEN 650 MG: 325 TABLET, FILM COATED ORAL at 08:57

## 2021-11-17 RX ADMIN — ACETAMINOPHEN 650 MG: 325 TABLET, FILM COATED ORAL at 22:04

## 2021-11-17 RX ADMIN — PRENATAL WITH FERROUS FUM AND FOLIC ACID 1 TABLET: 3080; 920; 120; 400; 22; 1.84; 3; 20; 10; 1; 12; 200; 27; 25; 2 TABLET ORAL at 08:57

## 2021-11-17 RX ADMIN — DOCUSATE SODIUM 100 MG: 100 CAPSULE ORAL at 08:57

## 2021-11-17 RX ADMIN — IBUPROFEN 800 MG: 800 TABLET, FILM COATED ORAL at 08:57

## 2021-11-17 ASSESSMENT — PAIN DESCRIPTION - PAIN TYPE: TYPE: ACUTE PAIN

## 2021-11-17 NOTE — L&D DELIVERY NOTE
DATE OF SERVICE:  2021     IDENTIFICATION:  This is a 36-year-old  4, para 1-0-2-1, with an EDC of   2021 who presents at 39 and 1/7th weeks with elevated blood pressures.     HISTORY OF PRESENT ILLNESS:  A patient of mine who has gotten good prenatal   care.  Prenatal care has been uncomplicated.  She was diagnosed with   gestational hypertension without severe features. Sent to labor and delivery.   Cervix was found to be 1, 50, -2 after a few doses of Cytotec.  She was   started on Ancef for her beta strep positive status and after her second dose   of antibiotics.  She was AROM clear at 2-1/2, 60, -2.  An IUPC was placed.    She received an epidural for pain control, was started on Pitocin, progressed   over normal labor curve to complete with an overall reassuring fetal heart   tracing.  At complete, she was able to push baby down to +3 station, extend   the baby's head and deliver atraumatically.  Nares and mouth were bulb   suctioned.  There was no nuchal cord.  Shoulders and body followed without   complication.  After delay, the cord was clamped and cut.  Cord gases were   collected and held.  The infant was handed off to waiting nursing team.  At   this point, the placenta delivered intact, 3-vessel cord.  Uterus firmed up   nicely after 20 units of Pitocin.  The cervix was intact.  There was a small   second-degree midline laceration, which was repaired with 3-0 Monocryl running   and locking above the hymenal ring.  Estimated blood loss was 300 mL. The   patient and baby recovered in stable condition.     FINDINGS:  A female infant with Apgars of 8 and 9.     COMPLICATIONS:  There were no complications.        ______________________________  MD RADHA Farris/RAFI    DD:  2021 18:04  DT:  2021 19:25    Job#:  [id number]

## 2021-11-17 NOTE — PROGRESS NOTES
Assume care form labor and delivery. Oriente pt to room, call light, emergency light, tv, and bed remote. Assessment complete. Fundus firm, lochia light.  Plan of care reviewed. Pt verbalized understanding. Denies pain at this time, educated to call if pt has pain.

## 2021-11-17 NOTE — PROGRESS NOTES
Hospital Day : 1    S: doing well; min bleed; breast feed    O:  Vitals:    21 2200 21 2255 21 2350 21 0200   BP:   142/88 141/79   Pulse:  83  74   Resp:  20  18   Temp: 36.2 °C (97.2 °F) 36.1 °C (97 °F)  36.4 °C (97.6 °F)   TempSrc: Temporal Temporal  Temporal   SpO2:  95%  96%   Weight:       Height:           Recent Labs     11/15/21  1640 21  0135   WBC 13.2* 15.2*   RBC 4.36 3.96*   HEMOGLOBIN 13.1 12.3   HEMATOCRIT 39.4 36.0*   MCV 90.4 90.9   MCH 30.0 31.1   MCHC 33.2* 34.2   RDW 43.5 44.1   PLATELETCT 278 234   MPV 10.1 9.8     Recent Labs     11/15/21  1640   SODIUM 137   POTASSIUM 4.2   CHLORIDE 102   CO2 20   GLUCOSE 74   BUN 19   CREATININE 0.67   CALCIUM 9.0         abd soft ff    A: pp1 sp  at term with iol for gest htn without severe features;  gbs pos sp abx x3 (2x prior to arom); deired dc    P: dc if baby dc

## 2021-11-17 NOTE — ANESTHESIA TIME REPORT
Anesthesia Start and Stop Event Times     Date Time Event    11/16/2021 1300 Ready for Procedure     1306 Anesthesia Start     1748 Anesthesia Stop        Responsible Staff  11/16/21    Name Role Begin End    Bruno Currie D.O. Anesth 1306 174        Preop Diagnosis (Free Text):  Pre-op Diagnosis             Preop Diagnosis (Codes):    Premium Reason  A. 3PM - 7AM    Comments:

## 2021-11-17 NOTE — ANESTHESIA POSTPROCEDURE EVALUATION
Patient: Hillary Bowman    Procedure Summary     Date: 11/16/21 Room / Location:     Anesthesia Start: 1306 Anesthesia Stop: 1748    Procedure: Labor Epidural Diagnosis:     Scheduled Providers:  Responsible Provider: Bruno Currie D.O.    Anesthesia Type: epidural ASA Status: 3          Final Anesthesia Type: epidural  Last vitals  BP   Blood Pressure: 130/86    Temp   36.6 °C (97.8 °F)    Pulse   76   Resp   16    SpO2   95 %      Anesthesia Post Evaluation    Patient location during evaluation: floor  Patient participation: complete - patient participated  Level of consciousness: awake and alert  Pain score: 4    Airway patency: patent  Anesthetic complications: no  Cardiovascular status: hemodynamically stable  Respiratory status: acceptable  Hydration status: euvolemic    PONV: none          No complications documented.

## 2021-11-17 NOTE — PROGRESS NOTES
1900 Bedside report received from Mirian, patient helped to latch infant, bleeding light with medium sized blood clots, fundus firm    2000 Patient unable to lift L leg, will attempt to ambulate in an hour    2200 Patient up to bathroom to void, L leg still a little heavy per patient, but steady on feet; transferred to postpartum via wheelchair, bedside report given to Cynthia PINO

## 2021-11-18 VITALS
BODY MASS INDEX: 42.65 KG/M2 | HEART RATE: 78 BPM | TEMPERATURE: 97.7 F | OXYGEN SATURATION: 96 % | SYSTOLIC BLOOD PRESSURE: 135 MMHG | HEIGHT: 69 IN | RESPIRATION RATE: 20 BRPM | DIASTOLIC BLOOD PRESSURE: 80 MMHG | WEIGHT: 288 LBS

## 2021-11-18 PROCEDURE — 700102 HCHG RX REV CODE 250 W/ 637 OVERRIDE(OP): Performed by: OBSTETRICS & GYNECOLOGY

## 2021-11-18 PROCEDURE — A9270 NON-COVERED ITEM OR SERVICE: HCPCS | Performed by: OBSTETRICS & GYNECOLOGY

## 2021-11-18 RX ADMIN — PRENATAL WITH FERROUS FUM AND FOLIC ACID 1 TABLET: 3080; 920; 120; 400; 22; 1.84; 3; 20; 10; 1; 12; 200; 27; 25; 2 TABLET ORAL at 08:08

## 2021-11-18 RX ADMIN — DOCUSATE SODIUM 100 MG: 100 CAPSULE ORAL at 08:08

## 2021-11-18 RX ADMIN — ACETAMINOPHEN 650 MG: 325 TABLET, FILM COATED ORAL at 04:40

## 2021-11-18 RX ADMIN — IBUPROFEN 800 MG: 800 TABLET, FILM COATED ORAL at 01:31

## 2021-11-18 ASSESSMENT — EDINBURGH POSTNATAL DEPRESSION SCALE (EPDS)
THE THOUGHT OF HARMING MYSELF HAS OCCURRED TO ME: NEVER
I HAVE FELT SCARED OR PANICKY FOR NO GOOD REASON: NO, NOT MUCH
I HAVE BEEN ABLE TO LAUGH AND SEE THE FUNNY SIDE OF THINGS: AS MUCH AS I ALWAYS COULD
I HAVE FELT SAD OR MISERABLE: NO, NOT AT ALL
I HAVE LOOKED FORWARD WITH ENJOYMENT TO THINGS: AS MUCH AS I EVER DID
THINGS HAVE BEEN GETTING ON TOP OF ME: NO, I HAVE BEEN COPING AS WELL AS EVER
I HAVE BEEN SO UNHAPPY THAT I HAVE HAD DIFFICULTY SLEEPING: NOT AT ALL
I HAVE BEEN SO UNHAPPY THAT I HAVE BEEN CRYING: NO, NEVER
I HAVE BEEN ANXIOUS OR WORRIED FOR NO GOOD REASON: NO, NOT AT ALL
I HAVE BLAMED MYSELF UNNECESSARILY WHEN THINGS WENT WRONG: YES, SOME OF THE TIME

## 2021-11-18 ASSESSMENT — PAIN DESCRIPTION - PAIN TYPE: TYPE: ACUTE PAIN

## 2021-11-18 NOTE — CONSULTS
Please see previous LC's chart note for information on initial lactation visit.    Met with MOB for a lactation follow up visit.  MOB stated infant is breastfeeding well and denied pain with latch.  MOB stated she has difficulty latching infant onto her right breast in the cross cradle position.  Latch assistance offered.    Demonstrated positioning of infant at the right breast in the football hold position.  Encouraged MOB to place infant nipple to nose and reminded her to bring infant's right arm down around breast.  MOB then encouraged to wedge breast, stroke her nipple down infant's chin to nose, and to wait for infant to open her mouth up wide before placing her breast into infant's mouth.  Deep latch achieved and MOB denied pain with latch.    MOB reminded to [facility] for [facility] assistance post discharge should any lactation questions and/or concerns arise after she goes home.    Breastfeeding Plan:  Continue to offer infant the breast per feeding cues for a minimum of 8 or more feeds in a 24 hour period.    MOB verbalized understanding of all information provided to her and denied having any further questions at this time.  Encouraged MOB to call for lactation assistance as needed.

## 2021-11-18 NOTE — DISCHARGE INSTRUCTIONS
PATIENT DISCHARGE EDUCATION INSTRUCTION SHEET  REASONS TO CALL YOUR OBSTETRICIAN  · Persistent fever, shaking, chills (Temperature higher than 100.4) may indicate you have an infection  · Heavy bleeding: soaking more than 1 pad per hour; Passing clots an egg-sized clot or bigger may mean you have an postpartum hemorrhage  · Foul odor from vagina or bad smelling or discolored discharge or blood  · Breast infection (Mastitis symptoms); breast pain, chills, fever, redness or red streaks, may feel flu like symptoms  · Urinary pain, burning or frequency  · Incision that is not healing, increased redness, swelling, tenderness or pain, or any pus from episiotomy or  site may mean you have an infection  · Redness, swelling, warmth, or painful to touch in the calf area of your leg may mean you have a blood clot  · Severe or intensified depression, thoughts or feelings of wanting to hurt yourself or someone else   · Pain in chest, obstructed breathing or shortness of breath (trouble catching your breath) may mean you are having a postpartum complication. Call your provider immediately   · Headache that does not get better, even after taking medicine, a bad headache with vision changes or pain in the upper right area of your belly may mean you have high blood pressure or post birth preeclampsia. Call your provider immediately    HAND WASHING  All family and friends should wash their hands:  · Before and after holding the baby  · Before feeding the baby  · After using the restroom or changing the baby's diaper    WOUND CARE  Ask your physician for additional care instructions. In general:  · Episiotomy/Laceration  · May use tanja-spray bottle, witch hazel pads and dermaplast spray for comfort  · Use tanja-spray bottle after urinating to cleanse perineal area  · To prevent burning during urination spray tanja-water bottle on labial area   · Pat perineal area dry until episiotomy/laceration is healed  · Continue to use  tanja-bottle until bleeding stops as needed  · If have a 2nd degree laceration or greater, a Sitz bath can offer relief from soreness, burning, and inflammation   · Sitz Bath   · Sit in 6 inches of warm water and soak laceration as needed until the laceration heals    VAGINAL CARE AND BLEEDING  · Nothing inside vagina for 6 weeks:   · No sexual intercourse, tampons or douching  · Bleeding may continue for 2-4 weeks. Amount and color may vary  · Soaking 1 pad or more in an hour for several hours is considered heavy bleeding  · Passing large egg sized blood clots can be concerning  · If you feel like you have heavy bleeding or are having increasing amount of blood clots call your Obstetrician immediately  · If you begin feeling faint upon standing, feeling sick to your stomach, have clammy skin, a really fast heartbeat, have chills, start feeling confused, dizzy, sleepy or weak, or feeling like you're going to faint call your Obstetrician immediately    HYPERTENSION   Preeclampsia or gestational hypertension are types of high blood pressure that only pregnant women can get. It is important for you to be aware of symptoms to seek early intervention and treatment. If you have any of these symptoms immediately call your Obstetrician    · Vision changes or blurred vision   · Severe headache or pain that is unrelieved with medication and will not go away  · Persistent pain in upper abdomen or shoulder   · Increased swelling of face, feet, or hands  · Difficulty breathing or shortness of breath at rest  · Urinating less than usual    URINATION AND BOWEL MOVEMENTS  · Eating more fiber (bran cereal, fruits, and vegetables) and drinking plenty of fluids will help to avoid constipation  · Urinary frequency and urgency after childbirth is normal  · If you experience any urinary pain, burning or frequency call your provider    BIRTH CONTROL  · It is possible to become pregnant at any time after delivery and while  "breastfeeding  · Plan to discuss a method of birth control with your physician at your post delivery follow up visit    POSTPARTUM BLUES  During the first few days after birth, you may experience a sense of the \"blues\" which may include impatience, irritability or even crying. These feelings come and go quickly. However, as many as 1 in 10 women experience emotional symptoms known as postpartum depression.     POSTPARTUM DEPRESSION    May start as early as the second or third day after delivery or take several weeks or months to develop. Symptoms of \"blues\" are present, but are more intense: Crying spells; loss of appetite; feelings of hopelessness or loss of control; fear of touching the baby; over concern or no concern at all about the baby; little or no concern about your own appearance/caring for yourself; and/or inability to sleep or excessive sleeping. Contact your Obstetrician if you are experiencing any of these symptoms     PREVENTING SHAKEN BABY  If you are angry or stressed, PUT THE BABY IN THE CRIB, step away, take some deep breaths, and wait until you are calm to care for the baby. DO NOT SHAKE THE BABY. You are not alone, call a supporter for help.  · [facility] 24/7 crisis call line (923-735-5042) or (1-839.583.5681)  · You can also text them, text \"ANSWER\" (376172)      "

## 2021-11-18 NOTE — DISCHARGE SUMMARY
Obstetrics Discharge Summary    Admission Date: 11/15/2021         Discharge Date: 2021    ADMISSION DIAGNOSIS:  1. Term intrauterine pregnancy at 39+1 weeks.  2. Gestational hypertension.  3. Advanced maternal age.   4. GBS positive.     DISCHARGE DIAGNOSIS:  1. Term intrauterine pregnancy at 39+1 weeks.  2. Gestational hypertension.  3. Advanced maternal age.   4. GBS positive.     DETAILS OF HOSPITAL STAY  Presenting Problem/History of Present Illness: Gestational hypertension.     Hospital Course:  Patient is a 36 y.o. , who presented to [facility] at 39w2d for an induction of labor due to gestational hypertension. She had prenatal care with [facility] with Dr. Morelos. Pregnancy was complicated by gHtn, AMA and GBS positive. For full details of the delivery please refer to the delivery note dictation. Briefly, the patient underwent an uncomplicated normal spontaneous vaginal delivery. There were no complications. Estimated blood loss was 300 ml. The patient delivered a viable female infant weighing 2885g (6lbs 5.8oz) with Apgars as below in delivery summary. Patient’s recovery and postpartum course were unremarkable. By postpartum day #2 patient met all appropriate milestones and was stable to be discharged to home.    APGARs:   8   9      COMPLICATIONS: none    PHYSICAL EXAM:  Vitals: .  Vitals:    21 0200   BP: 127/72   Pulse: 79   Resp: 20   Temp: 36.3 °C (97.3 °F)   SpO2: 98%   General: Alert, conversational, pleasant, no acute distress  CVS: Regular rate  PULM: No respiratory distress, symmetric expansion  ABD: Soft, non-tender, non-distended, fundus firm, non-tender, below the umbilicus  : Deferred  Extremities: Moves all, trace edema     LABS/STUDIES:   Results for DOREEN KESY (MRN [id number]) as of 2021 17:00   Ref. Range 11/15/2021 16:40 2021 01:35   WBC Latest Ref Range: 4.8 - 10.8 K/uL 13.2 (H) 15.2 (H)   RBC Latest Ref Range: 4.20 -  5.40 M/uL 4.36 3.96 (L)   Hemoglobin Latest Ref Range: 12.0 - 16.0 g/dL 13.1 12.3   Hematocrit Latest Ref Range: 37.0 - 47.0 % 39.4 36.0 (L)   MCV Latest Ref Range: 81.4 - 97.8 fL 90.4 90.9   MCH Latest Ref Range: 27.0 - 33.0 pg 30.0 31.1   MCHC Latest Ref Range: 33.6 - 35.0 g/dL 33.2 (L) 34.2   RDW Latest Ref Range: 35.9 - 50.0 fL 43.5 44.1   Platelet Count Latest Ref Range: 164 - 446 K/uL 278 234   MPV Latest Ref Range: 9.0 - 12.9 fL 10.1 9.8     DISPOSITION: Home.    DISCHARGE MEDICATIONS:  - Ibuprofen 600 mg every 6 hours as needed for pain.    DISCHARGE INSTRUCTIONS:  1. Pelvic rest for 6 weeks postpartum.   2. Postpartum visit in 6 weeks at [facility] [phone].  3. Return to the emergency department if experiencing increased vaginal bleeding, severe pain, temperature greater than 100.4, or any other concerns.    DISCHARGE CONDITION: Stable.    Jewell Roman M.D.

## 2021-11-18 NOTE — LACTATION NOTE
This note was copied from a baby's chart.  Mom is a 35 y/o P2 who delivered baby girl weighing 6 # 5.8 oz at 39.2 wks. Mom breast fed her first for 18 months and reports this baby fed well at transition. Baby has some spit up and a few good feeds but that last one being in the early morning hours. Reviewed demand feeds of 8 or more times in 24 hours and encouraging feeds not to go beyond three hours. Discussed remaining STS with baby and calling for help if baby does not latch.   LC sat mom upright with pillow support at back and placed baby on FB hold with pillow support. Demonstrated hand expression and assisted with latch. Baby was able to latch and had a slow rhythmic suckle pattern but needed a lot of stimulation. Eventually baby did have bursts of suckles with swallows heard. Mom did state she heard swallows earlier.  Reviewed how to tell if baby is getting enough. Gave information for [employer] hand expression video. Mom does have a pump at home for personal use. Reviewed voids and stool and what to expect over next several days.  Parents have no questions and were given follow up information for [facility] support.  
dentures/hearing aid/walker

## 2021-11-18 NOTE — PROGRESS NOTES
Assessment complete, pt resting comfortably in bed at this time. Fundus firm, lochia light. Pain controlled with prn medications per mar. Pt states voiding without difficulty. POC discussed. Call light in reach of pt, encouraged to call for assistance.

## 2024-05-03 NOTE — CARE PLAN
The patient is Stable - Low risk of patient condition declining or worsening    Shift Goals  Clinical Goals:  (fundus firm bleeding light)  Patient Goals: Healthy baby    Progress made toward(s) clinical / shift goals:  pt passed small clot, bleeding light. Pt voiding. Educated on too much bleeding and call light    Patient is not progressing towards the following goals:      
The patient is Stable - Low risk of patient condition declining or worsening    Shift Goals  Clinical Goals: VS will remain WNL  Patient Goals: Healthy baby    Progress made toward(s) clinical / shift goals:  Pt's VS WNL    Patient is not progressing towards the following goals: n/a      
The patient is stable at this time.   Shift Goals  Clinical Goals: rest,pain control, breastfeeding support   Patient Goals: Healthy baby    Progress made toward(s) clinical / shift goals:  will maintain normal BP  Patient is not progressing towards the following goals: N/A  Problem: Pain - Standard  Goal: Alleviation of pain or a reduction in pain to the patient’s comfort goal  Outcome: Progressing  Note: Pain control at this time. Will be medicated as needed.      Problem: Altered Physiologic Condition  Goal: Patient physiologically stable as evidenced by normal lochia, palpable uterine involution and vitals within normal limits  Outcome: Progressing  Note: Fundus firm at U, lochia rubra minimal. VSS         
Condition:: Acne
Please Describe Your Condition:: Experiencing hormonal acne on face and back after removal of her IUD.

## 2024-07-11 ENCOUNTER — HOSPITAL ENCOUNTER (OUTPATIENT)
Dept: RADIOLOGY | Facility: MEDICAL CENTER | Age: 39
End: 2024-07-11
Attending: OBSTETRICS & GYNECOLOGY
Payer: COMMERCIAL

## 2024-07-11 DIAGNOSIS — N64.4 MASTODYNIA: ICD-10-CM

## 2024-07-11 PROCEDURE — G0279 TOMOSYNTHESIS, MAMMO: HCPCS

## 2024-07-11 PROCEDURE — 76642 ULTRASOUND BREAST LIMITED: CPT | Mod: RT
